# Patient Record
Sex: MALE | Race: OTHER
[De-identification: names, ages, dates, MRNs, and addresses within clinical notes are randomized per-mention and may not be internally consistent; named-entity substitution may affect disease eponyms.]

---

## 2019-11-15 PROBLEM — Z00.00 ENCOUNTER FOR PREVENTIVE HEALTH EXAMINATION: Status: ACTIVE | Noted: 2019-11-15

## 2019-12-06 ENCOUNTER — APPOINTMENT (OUTPATIENT)
Dept: PLASTIC SURGERY | Facility: CLINIC | Age: 64
End: 2019-12-06
Payer: COMMERCIAL

## 2019-12-06 VITALS
DIASTOLIC BLOOD PRESSURE: 84 MMHG | HEART RATE: 66 BPM | SYSTOLIC BLOOD PRESSURE: 138 MMHG | TEMPERATURE: 98.2 F | RESPIRATION RATE: 20 BRPM | BODY MASS INDEX: 24.38 KG/M2 | HEIGHT: 72 IN | OXYGEN SATURATION: 98 % | WEIGHT: 180 LBS

## 2019-12-06 DIAGNOSIS — L81.9 DISORDER OF PIGMENTATION, UNSPECIFIED: ICD-10-CM

## 2019-12-06 PROCEDURE — 11300 SHAVE SKIN LESION 0.5 CM/<: CPT

## 2019-12-06 PROCEDURE — 11311 SHAVE SKIN LESION 0.6-1.0 CM: CPT

## 2019-12-06 PROCEDURE — XXXXX: CPT

## 2019-12-06 PROCEDURE — 99205 OFFICE O/P NEW HI 60 MIN: CPT | Mod: 25

## 2020-04-27 PROBLEM — L81.9 PIGMENTED SKIN LESION OF UNCERTAIN NATURE: Status: ACTIVE | Noted: 2020-04-27

## 2020-04-27 NOTE — PHYSICAL EXAM
[NI] : Normal [de-identified] : r upper forehead pigmented lesion, r forehead r scalp r neck anterior , l anterior chest upper back middle back all lesions >5mm and irregularly pigmented

## 2020-04-27 NOTE — PROCEDURE
[Nl] : None [FreeTextEntry2] : shave biopsies x 7  [FreeTextEntry1] : multiple pigmented lesions face and trunk [FreeTextEntry6] : pt with multiple lesions increased in size no ph melanoma  rbal.s outlined  shave biopsies and cauterizations with e cautery completed. lesions 5 mm and larger

## 2020-04-27 NOTE — HISTORY OF PRESENT ILLNESS
[FreeTextEntry1] : pt has several lesions on back that are pigmented and will require biopsy because they are new and increasing in size.  The risks, benefits, alternatives, limitations and the permanent scars were outlined with the patient.\par He lives in [place] and desires to be treated today